# Patient Record
Sex: FEMALE | Race: WHITE | NOT HISPANIC OR LATINO | Employment: FULL TIME | URBAN - METROPOLITAN AREA
[De-identification: names, ages, dates, MRNs, and addresses within clinical notes are randomized per-mention and may not be internally consistent; named-entity substitution may affect disease eponyms.]

---

## 2019-06-18 ENCOUNTER — HOSPITAL ENCOUNTER (EMERGENCY)
Facility: MEDICAL CENTER | Age: 34
End: 2019-06-18
Attending: EMERGENCY MEDICINE
Payer: COMMERCIAL

## 2019-06-18 ENCOUNTER — APPOINTMENT (OUTPATIENT)
Dept: RADIOLOGY | Facility: MEDICAL CENTER | Age: 34
End: 2019-06-18
Attending: EMERGENCY MEDICINE
Payer: COMMERCIAL

## 2019-06-18 VITALS
RESPIRATION RATE: 18 BRPM | HEIGHT: 64 IN | TEMPERATURE: 97.4 F | OXYGEN SATURATION: 98 % | BODY MASS INDEX: 39.93 KG/M2 | HEART RATE: 74 BPM | DIASTOLIC BLOOD PRESSURE: 64 MMHG | SYSTOLIC BLOOD PRESSURE: 129 MMHG | WEIGHT: 233.91 LBS

## 2019-06-18 DIAGNOSIS — R47.9 SPEECH DISTURBANCE, UNSPECIFIED TYPE: ICD-10-CM

## 2019-06-18 DIAGNOSIS — Z86.69 HISTORY OF MIGRAINE: ICD-10-CM

## 2019-06-18 LAB
ALBUMIN SERPL BCP-MCNC: 4.1 G/DL (ref 3.2–4.9)
ALBUMIN/GLOB SERPL: 1.4 G/DL
ALP SERPL-CCNC: 66 U/L (ref 30–99)
ALT SERPL-CCNC: 33 U/L (ref 2–50)
ANION GAP SERPL CALC-SCNC: 11 MMOL/L (ref 0–11.9)
AST SERPL-CCNC: 21 U/L (ref 12–45)
BASOPHILS # BLD AUTO: 0.2 % (ref 0–1.8)
BASOPHILS # BLD: 0.02 K/UL (ref 0–0.12)
BILIRUB SERPL-MCNC: 0.5 MG/DL (ref 0.1–1.5)
BUN SERPL-MCNC: 12 MG/DL (ref 8–22)
CALCIUM SERPL-MCNC: 9 MG/DL (ref 8.4–10.2)
CHLORIDE SERPL-SCNC: 101 MMOL/L (ref 96–112)
CO2 SERPL-SCNC: 25 MMOL/L (ref 20–33)
CREAT SERPL-MCNC: 1.11 MG/DL (ref 0.5–1.4)
EOSINOPHIL # BLD AUTO: 0.25 K/UL (ref 0–0.51)
EOSINOPHIL NFR BLD: 3 % (ref 0–6.9)
ERYTHROCYTE [DISTWIDTH] IN BLOOD BY AUTOMATED COUNT: 43.4 FL (ref 35.9–50)
GLOBULIN SER CALC-MCNC: 3 G/DL (ref 1.9–3.5)
GLUCOSE SERPL-MCNC: 98 MG/DL (ref 65–99)
HCG SERPL QL: NEGATIVE
HCT VFR BLD AUTO: 44.4 % (ref 37–47)
HGB BLD-MCNC: 14.5 G/DL (ref 12–16)
IMM GRANULOCYTES # BLD AUTO: 0.01 K/UL (ref 0–0.11)
IMM GRANULOCYTES NFR BLD AUTO: 0.1 % (ref 0–0.9)
LYMPHOCYTES # BLD AUTO: 3.25 K/UL (ref 1–4.8)
LYMPHOCYTES NFR BLD: 39.5 % (ref 22–41)
MCH RBC QN AUTO: 29.8 PG (ref 27–33)
MCHC RBC AUTO-ENTMCNC: 32.7 G/DL (ref 33.6–35)
MCV RBC AUTO: 91.2 FL (ref 81.4–97.8)
MONOCYTES # BLD AUTO: 0.59 K/UL (ref 0–0.85)
MONOCYTES NFR BLD AUTO: 7.2 % (ref 0–13.4)
NEUTROPHILS # BLD AUTO: 4.1 K/UL (ref 2–7.15)
NEUTROPHILS NFR BLD: 50 % (ref 44–72)
NRBC # BLD AUTO: 0 K/UL
NRBC BLD-RTO: 0 /100 WBC
PLATELET # BLD AUTO: 343 K/UL (ref 164–446)
PMV BLD AUTO: 9 FL (ref 9–12.9)
POTASSIUM SERPL-SCNC: 3.8 MMOL/L (ref 3.6–5.5)
PROT SERPL-MCNC: 7.1 G/DL (ref 6–8.2)
RBC # BLD AUTO: 4.87 M/UL (ref 4.2–5.4)
SODIUM SERPL-SCNC: 137 MMOL/L (ref 135–145)
WBC # BLD AUTO: 8.2 K/UL (ref 4.8–10.8)

## 2019-06-18 PROCEDURE — 99284 EMERGENCY DEPT VISIT MOD MDM: CPT

## 2019-06-18 PROCEDURE — 84703 CHORIONIC GONADOTROPIN ASSAY: CPT

## 2019-06-18 PROCEDURE — 80053 COMPREHEN METABOLIC PANEL: CPT

## 2019-06-18 PROCEDURE — 85025 COMPLETE CBC W/AUTO DIFF WBC: CPT

## 2019-06-18 PROCEDURE — 70450 CT HEAD/BRAIN W/O DYE: CPT

## 2019-06-18 RX ORDER — METOCLOPRAMIDE HYDROCHLORIDE 5 MG/ML
10 INJECTION INTRAMUSCULAR; INTRAVENOUS ONCE
Status: DISCONTINUED | OUTPATIENT
Start: 2019-06-18 | End: 2019-06-18 | Stop reason: HOSPADM

## 2019-06-18 RX ORDER — DIPHENHYDRAMINE HYDROCHLORIDE 50 MG/ML
25 INJECTION INTRAMUSCULAR; INTRAVENOUS ONCE
Status: DISCONTINUED | OUTPATIENT
Start: 2019-06-18 | End: 2019-06-18 | Stop reason: HOSPADM

## 2019-06-19 NOTE — ED TRIAGE NOTES
"Chief Complaint   Patient presents with   • Migraine   • Dizziness     Pt reports symptoms started Friday, word finding difficulty, fatigue, dizziness. Pain to left posterior head. Denies injury. States Hx migraine HA. GCS 15. No neuro deficits noted. Pt with slow speech noted.   BP (!) 163/117   Pulse 73   Temp 36.3 °C (97.4 °F) (Temporal)   Resp 16   Ht 1.626 m (5' 4\")   Wt 106.1 kg (233 lb 14.5 oz)   Pt informed of wait times. Educated on triage process.  Asked to return to triage RN for any new or worsening of symptoms. Thanked for patience.        "

## 2019-06-19 NOTE — ED NOTES
"Pt reporting generalized weakness, fatigue and slight posterior headache. Pt reports hx of migraines and states that this one feels \"weird\". Pt alert and oriented x 4, ambulatory with steady gait. Pt states that she does not want medication at this time for headache. Informed pt to notify RN if pain worsens or if she ends up wanting medication.   "

## 2019-06-19 NOTE — ED PROVIDER NOTES
ED Provider Note    ED Provider Note    Primary care provider: No primary care provider on file.  Means of arrival: Walk-in  History obtained from: Patient    CHIEF COMPLAINT  Chief Complaint   Patient presents with   • Migraine   • Dizziness     Seen at 6:59 PM.   HPI  Corine Olmos is a 33 y.o. female who presents to the Emergency Department with 4 days of difficulty with her speech.  She states that she feels somewhat off, she is normally very fast talking and articulate but she feels that for the past 4 days she has some difficulty with the speech and a feeling of generalized unwellness.  She also notes a very mild left-sided headache and some pain behind the left eye.    She is visiting from [place].  She states that she normally does not go to the emergency department.  She feels that she is overall healthy.  She does not have any prior diagnosis of hypertension is not any medications currently.  She does have a distant history of migraines, she states that when she was a teenager they were severe causing her to be incapacitated and sleeping for several days at a time.  Her headache today is nothing in comparison to that there migraine headache she had when she was a child.    She does have some vague chronic mild abdominal pain, unchanged today.  She has had colonoscopies of this in the past.  She apparently also had generalized malaise and body aches years ago when she was living in [place], she saw a rheumatology and had a full work-up, she was never given a formal diagnosis and most of the symptoms resolved when she moved to [place].    She notes the a forementioned headache that is left-sided and throbbing.  She denies any visual changes.  She denies any neck pain, chest pain, abdominal pain.  She notes some tingling of the hands and feet usually in the evening times or when she  onto something firmly.  This is not a new phenomenon for her.  She denies any recreational drug use.  She smokes  "very occasional marijuana.  She did have a history of heavy alcohol consumption but not daily.  She quit all alcohol 48 hours before her symptoms began.  She denies any prior history of alcohol withdrawal.  She feels overall extremely fatigued as well.  She denies any increased stress.    REVIEW OF SYSTEMS  See HPI,   Remainder of ROS negative.     PAST MEDICAL HISTORY   has a past medical history of Migraine.    SURGICAL HISTORY  patient denies any surgical history    SOCIAL HISTORY  Social History   Substance Use Topics   • Smoking status: Never Smoker   • Smokeless tobacco: Never Used   • Alcohol use Yes      Comment: occ      History   Drug Use     Comment: marijuana occ       FAMILY HISTORY  History reviewed. No pertinent family history.    CURRENT MEDICATIONS  Reviewed.  See Encounter Summary.     ALLERGIES  No Known Allergies    PHYSICAL EXAM  VITAL SIGNS: /64   Pulse 74   Temp 36.3 °C (97.4 °F) (Temporal)   Resp 18   Ht 1.626 m (5' 4\")   Wt 106.1 kg (233 lb 14.5 oz)   SpO2 98%   BMI 40.15 kg/m²   Constitutional: Awake, alert in no apparent distress.  HENT: Normocephalic, atraumatic.  Bilateral external ears normal. Nose normal.   Eyes: Conjunctiva normal, non-icteric, EOMI. PERRLA.  Thorax & Lungs: Easy unlabored respirations, Clear to ascultation bilaterally.  Cardiovascular: Regular rate, Regular rhythm, No murmurs, rubs or gallops.  Abdomen:  Soft, nontender, nondistended, normal active bowel sounds.   :    Skin: Visualized skin is  Dry, No erythema, No rash.   Musculoskeletal:   No cyanosis, clubbing or edema.  Neurologic: Alert, CN II-XII intact. No visual field deficits. Sensation intact all extremities. 5/5 strength all four extremities without drift. Heel to shin intact, finger to nose intact. No neglect. Normal speech and syntax that the patient does occasionally have delay in her speech.    Psychiatric: Normal affect, Normal mood  Lymphatic:  No cervical LAD    RADIOLOGY  CT-HEAD W/O "   Final Result         1.  No acute intracranial abnormality.      MR-BRAIN-W/O    (Results Pending)         COURSE & MEDICAL DECISION MAKING  Pertinent Labs & Imaging studies reviewed. (See chart for details)    Differential diagnoses include but are not limited to: Hypertensive urgency/emergency, anxiety, insomnia, fatigue/overexertion    6:59 PM - Medical record reviewed- no prior visits in our system., patient seen and examined at bedside.    8:14 PM -updated patient with the test results.      Decision Making:  This is a 33 y.o. year old female who presents with a chief complaint of feeling off and having some difficulty with her fluency.  The patient has normal syntax, she does not have any dysarthria.  She on occasion does have some slowing down of her speech but does not have any difficulty with word finding, she does not have any confusion.  Neurologically she is intact.  Her initial blood pressure was impressively elevated, therefore CT of the head was done to look for PRES. CT is negative for ICH or signs of CA ES.  Labs are unremarkable as well.  She did report heavy drinking history and stopped drinking 48 hours prior to the symptoms beginning.  I considered alcohol withdrawal but she is not tremulous, her hypertension resolved without any treatment and overall she feels that her alcohol consumption is not significant enough to cause any withdrawal type symptoms.    The patient states that she does not have any significant medical problems and she is otherwise healthy.  Despite this she also reports having several colonoscopies for chronic abdominal pain as well as seeing a rheumatologist for vague aches and pains and having work-up for lupus in the past.  Therefore she does likely have some underlying organic or psychological disorder.  I see no emergent process identified today.  I do not suspect TIA/CVA.  I have written an order for an outpatient MRI.  The patient is a traveler and spent several  weeks at a time in [place] and splits time between here in [place], I recommend she obtain a good primary care physician at either this location or at her home in [place] to continue the work-up.    Discharge Medications:  There are no discharge medications for this patient.      The patient was discharged home (see d/c instructions) and parent was told to return immediately for any signs or symptoms listed, or any worsening at all.  The patient's parent verbally agreed to the discharge precautions and follow-up plan which is documented in EPIC.    The patient's blood pressure is elevated today. >120/80. I have referred them to primary care for follow up.       FINAL IMPRESSION  1. Speech disturbance, unspecified type    2. History of migraine

## 2019-06-28 ENCOUNTER — HOSPITAL ENCOUNTER (OUTPATIENT)
Dept: RADIOLOGY | Facility: MEDICAL CENTER | Age: 34
End: 2019-06-28
Attending: EMERGENCY MEDICINE
Payer: COMMERCIAL

## 2019-06-28 DIAGNOSIS — Z86.69 HISTORY OF MIGRAINE: ICD-10-CM

## 2019-06-28 DIAGNOSIS — R47.9 SPEECH DISTURBANCE, UNSPECIFIED TYPE: ICD-10-CM

## 2019-06-28 PROCEDURE — 70551 MRI BRAIN STEM W/O DYE: CPT
